# Patient Record
Sex: FEMALE | Race: BLACK OR AFRICAN AMERICAN | NOT HISPANIC OR LATINO
[De-identification: names, ages, dates, MRNs, and addresses within clinical notes are randomized per-mention and may not be internally consistent; named-entity substitution may affect disease eponyms.]

---

## 2023-03-06 ENCOUNTER — NON-APPOINTMENT (OUTPATIENT)
Age: 63
End: 2023-03-06

## 2023-03-15 ENCOUNTER — NON-APPOINTMENT (OUTPATIENT)
Age: 63
End: 2023-03-15

## 2023-03-15 ENCOUNTER — APPOINTMENT (OUTPATIENT)
Dept: PHYSICAL MEDICINE AND REHAB | Facility: CLINIC | Age: 63
End: 2023-03-15
Payer: MEDICARE

## 2023-03-15 VITALS — SYSTOLIC BLOOD PRESSURE: 138 MMHG | OXYGEN SATURATION: 98 % | DIASTOLIC BLOOD PRESSURE: 85 MMHG | HEART RATE: 72 BPM

## 2023-03-15 DIAGNOSIS — Z87.39 PERSONAL HISTORY OF OTHER DISEASES OF THE MUSCULOSKELETAL SYSTEM AND CONNECTIVE TISSUE: ICD-10-CM

## 2023-03-15 DIAGNOSIS — Z86.79 PERSONAL HISTORY OF OTHER DISEASES OF THE CIRCULATORY SYSTEM: ICD-10-CM

## 2023-03-15 PROCEDURE — 99203 OFFICE O/P NEW LOW 30 MIN: CPT

## 2023-03-15 NOTE — REVIEW OF SYSTEMS
[Chest Pain] : no chest pain [Shortness Of Breath] : no shortness of breath [Abdominal Pain] : no abdominal pain [Dysuria] : no dysuria [Headache] : no headaches [Negative] : Eyes [FreeTextEntry9] : back, hip pain [de-identified] : hx of aneurysm
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
## 2023-03-15 NOTE — DATA REVIEWED
[MRI] : MRI [FreeTextEntry1] : [facility] IMAG[facility] OF THE LUMBAR SPINE\par TECHNIQUE: Multiplanar, multisequential MRI was performed in the neutral sitting position.\par HISTORY: Patient complains of lower back pain.\par INTERPRETATION: There is an exaggerated lordosis. There is a Grade I retrolisthesis at\par L3/4, along with slight anterolistheses at L4/5 and L5/S1. No fracture, other spondylolisthesis or\par loss in height of vertebral bodies is seen. There is disc desiccation from T10/11 through L1/2 as\par well as L3/4 through L5/S1. No marrow space signal intensity abnormality is noted. The conus\par extends as far caudal as the L1/2 interspace and is intrinsically intact.\par Axial images demonstrate a bulging disc at T12/L1 with a right foraminal herniation, impinging\par upon the exiting right T12 root.\par At Li/? there is a bulging dise with a right foraminal herniation, impinging upon the exiting right\par L1 root,\par At L2/3 there is a bulging disc with a right foraminal herniation, demonstrating near\par impingement upon the exiting right L2 root.\par At L3/4 there is a bulging disc with a broad right foraminal herniation, displacing the exiting\par right L3 root as well as a right paracentral herniation component with mild hemisac compression\par and impingement upon the originating right L4 root. In combination with ligamentum flavum\par thickening and facet joint hypertrophy, there is resulting mild canal and left lateral recess and\par foraminal stenosis as well as moderate right lateral recess and foraminal stenosis.\par At L4/5 there is a circumferentially bulging disc with a right foraminal herniation, displacing the\par exiting right L4 root. In combination with ligamentum flavum thickening and subluxing\par hypertrophic facet joints, there is resulting moderate bilateral lateral recess and foraminal\par stenosis.\par \par At L5/S1 there is a bulging disc with biforaminal encroachment. There is a right lateral\par herniation, impinging upon the exited right L5 root as well as a left foraminal herniation\par component, impinging upon the exiting right L5 root. There is a left paracentral herniation\par component with posterior extrusion. There is an inferiorly extruded central herniation\par component. There is mild thecal sac compression. There is impingement upon originating S1\par roots. In combination with subluxing hypertrophic facet joints, there is resulting moderate\par bilateral lateral recess and foraminal stenosis.\par Incidental note is made of a right renal cortical cyst of 2.1 cm in diameter, along with a 0.7 cm\par left renal parapelvic cyst. As clinically warranted, sonographic followup may be obtained.\par IMPRESSION:\par Bulging disc at T12/L1 with right foraminal herniation, impinging upon the exiting right\par T12 root.\par Bulging disc at L1/2 with right foraminal herniation, impinging upon the exiting right Ll\par root.\par Bulging disc at L2/3 with right foraminal herniation, demonstrating near impingement\par upon the exiting right L2 root.\par Bulging disc at L3/4 with broad right foraminal herniation, displacing the exiting right L3\par root. Right paracentral herniation component with mild hemisac compression and\par impingement upon the originating right L4 root. Mild canal stenosis and mild left and\par moderate right lateral recess and foraminal stenosis.\par Circumferentially bulging disc at L4/5 with right forarinal herniation, displacing the\par exiting right L4 root. Moderate bilateral lateral recess and foraminal stenosis.\par Bulging disc at L5/S1 with biforaminal encroachment. Right lateral herniation, impinging\par upon the exited right L5 root and left foraminal herniation component, impinging upon\par the exiting right L5 root. Left paracentral herniation component with posterior extrusion.\par Inferiorly extruded central herniation component with mild thecal sac compression and\par impingement upon originating S1 roots. Moderate bilateral lateral recess and foraminal\par stemasis.\par \par Bilateral renal cysts, warranting clinical consideration for sonographic followup.\par Thank you for referring your patient to us for evaluation.

## 2023-03-15 NOTE — PHYSICAL EXAM
[FreeTextEntry1] : Ms. LEXII VORA is a 62 year F with pain in the lower back on the right with radiation down the leg to the groin and leg. She reports chronic long standing pain and is noting an acute on chronic exacerbation of this pain due to lumbar radiculopathy vs hip DJD and labral involvement. There are no myelopathic signs on today's exam.\par \par Patient reassured and educated on the diagnosis and treatment options. Risks and benefits of treatment and of delaying treatment discussed with patient. Risks discussed include but not limited to: progression of symptoms, worsening pain and functional status, etc.\par \par MRI lumbar spine imaging reviewed with patient in office today.\par \par Sending patient for PT for lumbar radiculopathy to help relieve pain and improve function. Stretching, strengthening, ROM, home education and other appropriate interventions. Precautions include fall prevention.\par \par Follow up 6 weeks\par Consider right hip vs right radicular pain injection on next visit\par Consider hip XR\par \par Patient was advised if the following symptoms develop: chills, fever, loss of bladder control, bowel incontinence or urinary retention, numbness/tingling or weakness is present in upper or lower extremities, to go to the nearest emergency room. This may be a new clinical condition not present at the time of the patient visit that may lead to paralysis and/or death. Patient advised if the above symptoms developed to also call the office immediately to inform us and to go to the nearest emergency room.\par \par This note was generated using Dragon medical dictation software. A reasonable effort had been made for proofreading its contents, but spelling mistakes or grammatical errors may still remain. If there are any questions or points of clarification needed please notify my office.

## 2023-04-26 ENCOUNTER — APPOINTMENT (OUTPATIENT)
Dept: PHYSICAL MEDICINE AND REHAB | Facility: CLINIC | Age: 63
End: 2023-04-26

## 2023-06-14 ENCOUNTER — APPOINTMENT (OUTPATIENT)
Dept: PHYSICAL MEDICINE AND REHAB | Facility: CLINIC | Age: 63
End: 2023-06-14
Payer: MEDICARE

## 2023-06-14 DIAGNOSIS — M25.551 PAIN IN RIGHT HIP: ICD-10-CM

## 2023-06-14 DIAGNOSIS — T30.0 BURN OF UNSPECIFIED BODY REGION, UNSPECIFIED DEGREE: ICD-10-CM

## 2023-06-14 PROCEDURE — 99213 OFFICE O/P EST LOW 20 MIN: CPT

## 2023-06-14 RX ORDER — SILVER SULFADIAZINE 10 MG/G
1 CREAM TOPICAL TWICE DAILY
Qty: 100 | Refills: 0 | Status: ACTIVE | COMMUNITY
Start: 2023-06-14 | End: 1900-01-01

## 2023-06-14 NOTE — PHYSICAL EXAM
[FreeTextEntry1] : General exam \par \par Constitutional: The patient appears well-developed, well-nourished, and in no apparent distress. Patient is well-groomed.  \par \par Skin: The skin is warm and dry, with normal turgor.  No rashes or lesions are noted.  \par \par Eyes: PERRL.  \par \par ENMT: Ears: Hearing is grossly within normal limits.  \par \par Neck: Supple: The neck is supple.  \par \par Respiratory: Inspection: Breathing unlabored.  \par \par Neurologic: Alert and oriented x 3. \par \par Psychiatric: Patient is cooperative and appropriate.  Mood and affect are normal.  Patient's insight is good, and memory and judgment are intact.\par \par Lumbar spine\par Skin c/d/i without any erythema, swelling, effusion or tenderness\par Right upper buttock fluid filled blister 2 cm in length - intact
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
## 2023-06-14 NOTE — ASSESSMENT
[FreeTextEntry1] : Ms. LEXII VORA is a 62 year F with pain in the lower back on the right with radiation down the leg to the groin and leg. She reports chronic long standing pain and is noting an acute on chronic exacerbation of this pain due to lumbar radiculopathy vs hip DJD and labral involvement. There are no myelopathic signs on today's exam.\par \par Patient reassured and educated on the diagnosis and treatment options. Risks and benefits of treatment and of delaying treatment discussed with patient. Risks discussed include but not limited to: progression of symptoms, worsening pain and functional status, etc.\par \par Continue PT for lumbar radiculopathy to help relieve pain and improve function. Stretching, strengthening, ROM, home education and other appropriate interventions. Precautions include fall prevention.\par \par Rx for silvadene\par Advised to avoid heating pad use and to follow  instructions\par Follow up 2 months\par Consider right hip vs right radicular pain injection on next visit\par Consider hip XR\par \par Patient was advised if the following symptoms develop: chills, fever, loss of bladder control, bowel incontinence or urinary retention, numbness/tingling or weakness is present in upper or lower extremities, to go to the nearest emergency room. This may be a new clinical condition not present at the time of the patient visit that may lead to paralysis and/or death. Patient advised if the above symptoms developed to also call the office immediately to inform us and to go to the nearest emergency room.\par \par This note was generated using Dragon medical dictation software. A reasonable effort had been made for proofreading its contents, but spelling mistakes or grammatical errors may still remain. If there are any questions or points of clarification needed please notify my office.

## 2023-06-14 NOTE — REVIEW OF SYSTEMS
[Headache] : no headaches [Negative] : Constitutional [FreeTextEntry9] : back pain [de-identified] : burn on buttock

## 2023-08-21 ENCOUNTER — APPOINTMENT (OUTPATIENT)
Dept: PHYSICAL MEDICINE AND REHAB | Facility: CLINIC | Age: 63
End: 2023-08-21

## 2023-08-23 ENCOUNTER — APPOINTMENT (OUTPATIENT)
Dept: PHYSICAL MEDICINE AND REHAB | Facility: CLINIC | Age: 63
End: 2023-08-23
Payer: MEDICARE

## 2023-08-23 VITALS — OXYGEN SATURATION: 96 % | HEART RATE: 95 BPM | SYSTOLIC BLOOD PRESSURE: 122 MMHG | DIASTOLIC BLOOD PRESSURE: 80 MMHG

## 2023-08-23 PROCEDURE — 99213 OFFICE O/P EST LOW 20 MIN: CPT

## 2023-08-23 NOTE — HISTORY OF PRESENT ILLNESS
[FreeTextEntry1] : LEXII VORA is here for follow up. Since last visit she has been doing PT and HEP and her symptoms have slowly been improving. She is wondering about how to come off the Gabapentin. She is currently taking 600mg PO TID. Her heating pad burn resolved with Silvadene cream.   Denies any new pain, numbness or weakness, bowel/bladder dysfunction, saddle anesthesia, fevers, chills, weight loss, night pain, or night sweats at this time.

## 2023-08-23 NOTE — PHYSICAL EXAM
[FreeTextEntry1] : General exam   Constitutional: The patient appears well-developed, well-nourished, and in no apparent distress. Patient is well-groomed.    Skin: The skin is warm and dry, with normal turgor.  Eyes: PERRL.    ENMT: Ears: Hearing is grossly within normal limits.    Neck: Supple: The neck is supple.    Respiratory: Inspection: Breathing unlabored.    Neurologic: Alert and oriented x 3.   Psychiatric: Patient is cooperative and appropriate.  Mood and affect are normal.  Patient's insight is good, and memory and judgment are intact.

## 2023-08-23 NOTE — ASSESSMENT
[FreeTextEntry1] : Ms. LEXII VORA is a 62 year F with pain in the lower back on the right with radiation down the leg to the groin and leg due to lumbar radiculopathy vs hip DJD and labral involvement.  Patient reassured and educated on the diagnosis and treatment options. Risks and benefits of treatment and of delaying treatment discussed with patient. Risks discussed include but not limited to: progression of symptoms, worsening pain and functional status, etc.  This note was generated using Dragon medical dictation software. A reasonable effort had been made for proofreading its contents, but spelling mistakes or grammatical errors may still remain. If there are any questions or points of clarification needed please notify my office.   Continue PT to completion for lumbar radiculopathy to help relieve pain and improve function. Stretching, strengthening, ROM, home education and other appropriate interventions. Precautions include fall prevention. Transition to HEP  Advised to wean off Gabapentin: 600mg PO TID for 1 week, then decrease by 300mg per dose everyweek until completely stopping  Monitor for sedation, dizziness and any signs of respiratory depression. Avoid taking together with opioid pain medicines and other drugs that depress the central nervous system function. Avoid sudden cessation after prolonged use, due to risk of seizures. Risks and benefits were explained and patient expressed understanding.  Follow up 2 months  Consider right hip vs right radicular pain injections in future Consider hip XR  Patient was advised if the following symptoms develop: chills, fever, loss of bladder control, bowel incontinence or urinary retention, numbness/tingling or weakness is present in upper or lower extremities, to go to the nearest emergency room. This may be a new clinical condition not present at the time of the patient visit that may lead to paralysis and/or death. Patient advised if the above symptoms developed to also call the office immediately to inform us and to go to the nearest emergency room.

## 2023-11-01 ENCOUNTER — APPOINTMENT (OUTPATIENT)
Dept: PHYSICAL MEDICINE AND REHAB | Facility: CLINIC | Age: 63
End: 2023-11-01
Payer: MEDICARE

## 2023-11-01 VITALS — HEART RATE: 97 BPM | OXYGEN SATURATION: 98 % | SYSTOLIC BLOOD PRESSURE: 119 MMHG | DIASTOLIC BLOOD PRESSURE: 80 MMHG

## 2023-11-01 PROCEDURE — 99213 OFFICE O/P EST LOW 20 MIN: CPT

## 2024-01-10 ENCOUNTER — APPOINTMENT (OUTPATIENT)
Dept: PHYSICAL MEDICINE AND REHAB | Facility: CLINIC | Age: 64
End: 2024-01-10
Payer: MEDICARE

## 2024-01-10 PROCEDURE — 99213 OFFICE O/P EST LOW 20 MIN: CPT

## 2024-01-10 NOTE — ASSESSMENT
[FreeTextEntry1] : Ms. LEXII VORA is a 63 year F with pain in the lower back on the right with radiation down the leg to the groin and leg due to lumbar radiculopathy vs hip DJD and labral involvement. Pain is controlled with Gabapentin.  Patient reassured and educated on the diagnosis and treatment options. Risks and benefits of treatment and of delaying treatment discussed with patient. Risks discussed include but not limited to: progression of symptoms, worsening pain and functional status, etc.  This note was generated using Dragon medical dictation software. A reasonable effort had been made for proofreading its contents, but spelling mistakes or grammatical errors may still remain. If there are any questions or points of clarification needed please notify my office.  Will keep on Gabapentin 400mg PO TID Monitor for sedation, dizziness and any signs of respiratory depression. Avoid taking together with opioid pain medicines and other drugs that depress the central nervous system function. Avoid sudden cessation after prolonged use, due to risk of seizures. Risks and benefits were explained and patient expressed understanding.  Continue follow up with PCP and get annual labwork Follow up 3 months  Consider right hip vs right radicular pain injections in future Consider hip XR  Patient was advised if the following symptoms develop: chills, fever, loss of bladder control, bowel incontinence or urinary retention, numbness/tingling or weakness is present in upper or lower extremities, to go to the nearest emergency room. This may be a new clinical condition not present at the time of the patient visit that may lead to paralysis and/or death. Patient advised if the above symptoms developed to also call the office immediately to inform us and to go to the nearest emergency room.

## 2024-01-10 NOTE — HISTORY OF PRESENT ILLNESS
[FreeTextEntry1] : LEXII VORA is here for follow up. Since last visit she has gotten down to Gabapentin 1200mg daily with good pain control. She tried going down to 900mg but this exacerbated her pain. She sees her PCP and get's labwork done regularly and all the numbers are WNL. She wishe to continue with Gabapentin 1200mg daily. Denies any side effects.   Denies any new pain, numbness or weakness, bowel/bladder dysfunction, saddle anesthesia, fevers, chills, weight loss, night pain, or night sweats at this time.

## 2024-03-27 ENCOUNTER — APPOINTMENT (OUTPATIENT)
Dept: PHYSICAL MEDICINE AND REHAB | Facility: CLINIC | Age: 64
End: 2024-03-27
Payer: MEDICARE

## 2024-03-27 DIAGNOSIS — M25.561 PAIN IN RIGHT KNEE: ICD-10-CM

## 2024-03-27 DIAGNOSIS — G89.29 PAIN IN RIGHT KNEE: ICD-10-CM

## 2024-03-27 DIAGNOSIS — Z97.8 PRESENCE OF OTHER SPECIFIED DEVICES: ICD-10-CM

## 2024-03-27 PROCEDURE — 99213 OFFICE O/P EST LOW 20 MIN: CPT

## 2024-03-27 NOTE — PHYSICAL EXAM
[FreeTextEntry1] : General exam   Constitutional: The patient appears well-developed, well-nourished, and in no apparent distress. Patient is well-groomed.    Skin: The skin is warm and dry, with normal turgor.  Eyes: PERRL.    ENMT: Ears: Hearing is grossly within normal limits.    Neck: Supple: The neck is supple.    Respiratory: Inspection: Breathing unlabored.    Neurologic: Alert and oriented x 3.   Psychiatric: Patient is cooperative and appropriate.  Mood and affect are normal.  Patient's insight is good, and memory and judgment are intact.  Right knee Skin c/d/i without any erythema, swelling, effusion or tenderness Healed surgical scar

## 2024-03-27 NOTE — ASSESSMENT
[FreeTextEntry1] : Ms. LEXII VORA is a 63 year F with pain in the lower back on the right with radiation down the leg to the groin and leg due to lumbar radiculopathy vs hip DJD and labral involvement. Pain is controlled with Gabapentin.  Patient reassured and educated on the diagnosis and treatment options. Risks and benefits of treatment and of delaying treatment discussed with patient. Risks discussed include but not limited to: progression of symptoms, worsening pain and functional status, etc.  This note was generated using Dragon medical dictation software. A reasonable effort had been made for proofreading its contents, but spelling mistakes or grammatical errors may still remain. If there are any questions or points of clarification needed please notify my office.  Will keep on Gabapentin 400mg PO TID Monitor for sedation, dizziness and any signs of respiratory depression. Avoid taking together with opioid pain medicines and other drugs that depress the central nervous system function. Avoid sudden cessation after prolonged use, due to risk of seizures. Risks and benefits were explained and patient expressed understanding.  Sending for XR right knee to evaluate for any injury or hardware changes Continue follow up with PCP and get annual labwork Follow up 3 months  Consider right hip vs right radicular pain injections in future Consider hip XR  Patient was advised if the following symptoms develop: chills, fever, loss of bladder control, bowel incontinence or urinary retention, numbness/tingling or weakness is present in upper or lower extremities, to go to the nearest emergency room. This may be a new clinical condition not present at the time of the patient visit that may lead to paralysis and/or death. Patient advised if the above symptoms developed to also call the office immediately to inform us and to go to the nearest emergency room.
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
## 2024-04-03 ENCOUNTER — APPOINTMENT (OUTPATIENT)
Dept: PHYSICAL MEDICINE AND REHAB | Facility: CLINIC | Age: 64
End: 2024-04-03

## 2024-06-26 ENCOUNTER — APPOINTMENT (OUTPATIENT)
Dept: PHYSICAL MEDICINE AND REHAB | Facility: CLINIC | Age: 64
End: 2024-06-26
Payer: MEDICARE

## 2024-06-26 DIAGNOSIS — M54.16 RADICULOPATHY, LUMBAR REGION: ICD-10-CM

## 2024-06-26 PROCEDURE — 99214 OFFICE O/P EST MOD 30 MIN: CPT

## 2024-06-26 RX ORDER — GABAPENTIN 400 MG/1
400 CAPSULE ORAL
Qty: 270 | Refills: 0 | Status: ACTIVE | COMMUNITY
Start: 2024-01-10 | End: 1900-01-01

## 2024-09-18 ENCOUNTER — APPOINTMENT (OUTPATIENT)
Dept: PHYSICAL MEDICINE AND REHAB | Facility: CLINIC | Age: 64
End: 2024-09-18
Payer: MEDICARE

## 2024-09-18 DIAGNOSIS — M54.16 RADICULOPATHY, LUMBAR REGION: ICD-10-CM

## 2024-09-18 PROCEDURE — 99213 OFFICE O/P EST LOW 20 MIN: CPT

## 2024-09-18 NOTE — END OF VISIT
[] : Resident [FreeTextEntry3] : Seen with Dr. 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 Any part of the history and physical exam performed by the physician in training was either directly reviewed and/or replicated by myself. Any procedure performed by the physician in training was performed under my direct supervision and with the consent of the patient.

## 2024-09-18 NOTE — PHYSICAL EXAM
[FreeTextEntry1] : General exam  Constitutional: The patient appears well-developed, well-nourished, and in no apparent distress. Patient is well-groomed.  Skin: The skin is warm and dry, with normal turgor.  Eyes: PERRL.  ENMT: Ears: Hearing is grossly within normal limits.  Neck: Supple: The neck is supple.  Respiratory: Inspection: Breathing unlabored.  Neurologic: Alert and oriented x 3.  Psychiatric: Patient is cooperative and appropriate. Mood and affect are normal. Patient's insight is good, and memory and judgment are intact.  Right knee Healed surgical scar No joint laxity. Right patellar reflex hyporeflexive in setting of TKA. L patellar reflex 2+.

## 2024-09-18 NOTE — ASSESSMENT
[FreeTextEntry1] : Ms. LEXII VORA is a 64 year F with pain in the lower back on the right with radiation down the leg to the groin and leg due to lumbar radiculopathy vs hip DJD and labral involvement. Pain is controlled with Gabapentin.  Patient reassured and educated on the diagnosis and treatment options. Risks and benefits of treatment and of delaying treatment discussed with patient. Risks discussed include but not limited to: progression of symptoms, worsening pain and functional status, etc.  This note was generated using Dragon medical dictation software. A reasonable effort had been made for proofreading its contents, but spelling mistakes or grammatical errors may still remain. If there are any questions or points of clarification needed please notify my office.  Will keep on Gabapentin 400mg PO TID Monitor for sedation, dizziness and any signs of respiratory depression. Avoid taking together with opioid pain medicines and other drugs that depress the central nervous system function. Avoid sudden cessation after prolonged use, due to risk of seizures. Risks and benefits were explained and patient expressed understanding.  Continue follow up with PCP and get annual bloodwork particularly concerning kidney function. Patient reported appointment scheduled for November. Follow up 3 months  Consider right hip vs right radicular pain injections in future Consider hip XR  Patient was advised if the following symptoms develop: chills, fever, loss of bladder control, bowel incontinence or urinary retention, numbness/tingling or weakness is present in upper or lower extremities, to go to the nearest emergency room. This may be a new clinical condition not present at the time of the patient visit that may lead to paralysis and/or death. Patient advised if the above symptoms developed to also call the office immediately to inform us and to go to the nearest emergency room.

## 2024-09-18 NOTE — HISTORY OF PRESENT ILLNESS
[FreeTextEntry1] : LEXII VORA is here for follow up. Since last visit, patient reports a mild increase in pain overall. Patient attributes this to eating as a response to life stressors. Particularly, the unknown severity of a close friends cancer diagnosis with poor life expectancy over the next weeks. Currently, patient found a [employer] that is 24hr a day that she would like to attend to lose weight Did see PCP and LDL has decreased slightly. Patient starting on Crestor after failing Lipitor due to side effect of worsening pain. She is not in physical therapy. At this time, patient is hoping for a 3 month supply of Gabapentin that continues to help her pain and reduce it to a tolerable level. Today it is 5/10.She continues with Gabapentin 400mg PO TID. She is asking to send 3 month supply at the same time as this is more insurance efficient. Shared her life and family story today. Today, patient not interested in further imaging or injections, and pain is at an acceptable level with current management.    Denies any new pain, numbness or weakness, bowel/bladder dysfunction, saddle anesthesia, fevers, chills, weight loss, night pain, or night sweats at this time.

## 2024-12-18 ENCOUNTER — APPOINTMENT (OUTPATIENT)
Dept: PHYSICAL MEDICINE AND REHAB | Facility: CLINIC | Age: 64
End: 2024-12-18
Payer: MEDICARE

## 2024-12-18 DIAGNOSIS — Z97.8 PRESENCE OF OTHER SPECIFIED DEVICES: ICD-10-CM

## 2024-12-18 DIAGNOSIS — G89.29 PAIN IN RIGHT KNEE: ICD-10-CM

## 2024-12-18 DIAGNOSIS — M25.561 PAIN IN RIGHT KNEE: ICD-10-CM

## 2024-12-18 DIAGNOSIS — M54.16 RADICULOPATHY, LUMBAR REGION: ICD-10-CM

## 2024-12-18 DIAGNOSIS — M25.551 PAIN IN RIGHT HIP: ICD-10-CM

## 2024-12-18 PROCEDURE — 99213 OFFICE O/P EST LOW 20 MIN: CPT
